# Patient Record
Sex: FEMALE | Race: WHITE | NOT HISPANIC OR LATINO | Employment: OTHER | URBAN - METROPOLITAN AREA
[De-identification: names, ages, dates, MRNs, and addresses within clinical notes are randomized per-mention and may not be internally consistent; named-entity substitution may affect disease eponyms.]

---

## 2018-11-28 ENCOUNTER — NEW REFERRAL (OUTPATIENT)
Dept: URBAN - METROPOLITAN AREA CLINIC 26 | Facility: CLINIC | Age: 83
End: 2018-11-28

## 2018-11-28 VITALS
WEIGHT: 145 LBS | SYSTOLIC BLOOD PRESSURE: 104 MMHG | DIASTOLIC BLOOD PRESSURE: 80 MMHG | HEART RATE: 60 BPM | BODY MASS INDEX: 20.76 KG/M2 | HEIGHT: 70 IN

## 2018-11-28 DIAGNOSIS — H43.813: ICD-10-CM

## 2018-11-28 DIAGNOSIS — H02.833: ICD-10-CM

## 2018-11-28 DIAGNOSIS — H35.373: ICD-10-CM

## 2018-11-28 DIAGNOSIS — H04.123: ICD-10-CM

## 2018-11-28 DIAGNOSIS — H53.8: ICD-10-CM

## 2018-11-28 DIAGNOSIS — H02.836: ICD-10-CM

## 2018-11-28 PROCEDURE — 92004 COMPRE OPH EXAM NEW PT 1/>: CPT

## 2018-11-28 PROCEDURE — 92250 FUNDUS PHOTOGRAPHY W/I&R: CPT

## 2018-11-28 PROCEDURE — 92134 CPTRZ OPH DX IMG PST SGM RTA: CPT

## 2018-11-28 PROCEDURE — 92235 FLUORESCEIN ANGRPH MLTIFRAME: CPT

## 2018-11-28 ASSESSMENT — TONOMETRY
OS_IOP_MMHG: 15
OD_IOP_MMHG: 13

## 2018-11-28 ASSESSMENT — VISUAL ACUITY
OD_CC: 20/200
OD_PH: 20/100
OS_CC: 20/100

## 2020-01-28 ENCOUNTER — UNSCHEDULED FOLLOW UP (OUTPATIENT)
Dept: URBAN - METROPOLITAN AREA CLINIC 26 | Facility: CLINIC | Age: 85
End: 2020-01-28

## 2020-01-28 VITALS — BODY MASS INDEX: 19.76 KG/M2 | HEIGHT: 70 IN | WEIGHT: 138 LBS

## 2020-01-28 DIAGNOSIS — H43.813: ICD-10-CM

## 2020-01-28 DIAGNOSIS — H53.8: ICD-10-CM

## 2020-01-28 DIAGNOSIS — S05.00XA: ICD-10-CM

## 2020-01-28 DIAGNOSIS — H35.373: ICD-10-CM

## 2020-01-28 PROCEDURE — 92014 COMPRE OPH EXAM EST PT 1/>: CPT

## 2020-01-28 PROCEDURE — 92134 CPTRZ OPH DX IMG PST SGM RTA: CPT

## 2020-01-28 PROCEDURE — 92250 FUNDUS PHOTOGRAPHY W/I&R: CPT

## 2020-01-28 RX ORDER — ERYTHROMYCIN 5 MG/G: OINTMENT OPHTHALMIC

## 2020-01-28 ASSESSMENT — VISUAL ACUITY
OD_SC: 20/30-1
OS_SC: CF 2FT

## 2020-01-28 ASSESSMENT — TONOMETRY
OD_IOP_MMHG: 16
OS_IOP_MMHG: 14

## 2022-07-09 ENCOUNTER — TELEPHONE ENCOUNTER (OUTPATIENT)
Dept: URBAN - METROPOLITAN AREA CLINIC 121 | Facility: CLINIC | Age: 87
End: 2022-07-09

## 2022-07-09 RX ORDER — LEVOTHYROXINE SODIUM ANHYDROUS 100 UG/5ML
INJECTION, POWDER, LYOPHILIZED, FOR SOLUTION INTRAVENOUS
Refills: 0 | OUTPATIENT
Start: 2013-03-04 | End: 2014-09-17

## 2022-07-09 RX ORDER — ASPIRIN 81 MG/1
TABLET, COATED ORAL
Refills: 0 | OUTPATIENT
Start: 2013-03-04 | End: 2014-09-17

## 2022-07-09 RX ORDER — SERTRALINE 100 MG/1
TABLET, FILM COATED ORAL ONCE A DAY
Refills: 0 | OUTPATIENT
Start: 2014-09-17 | End: 2014-11-24

## 2022-07-09 RX ORDER — PANTOPRAZOLE SODIUM 40 MG/1
TABLET, DELAYED RELEASE ORAL
Refills: 0 | OUTPATIENT
Start: 2013-03-04 | End: 2014-09-17

## 2022-07-09 RX ORDER — LISINOPRIL 2.5 MG/1
TABLET ORAL
Refills: 0 | OUTPATIENT
Start: 2013-03-04 | End: 2014-09-17

## 2022-07-09 RX ORDER — LEVOTHYROXINE SODIUM 125 UG/1
TABLET ORAL ONCE A DAY
Refills: 0 | OUTPATIENT
Start: 2014-09-17 | End: 2014-11-24

## 2022-07-09 RX ORDER — FLECAINIDE ACETATE 50 MG/1
TABLET ORAL
Refills: 0 | OUTPATIENT
Start: 2014-09-17 | End: 2014-11-24

## 2022-07-09 RX ORDER — FLECAINIDE ACETATE 50 MG/1
TABLET ORAL TWICE A DAY
Refills: 0 | OUTPATIENT
Start: 2014-11-24 | End: 2016-01-05

## 2022-07-09 RX ORDER — METOPROLOL SUCCINATE 25 MG/1
TABLET, EXTENDED RELEASE ORAL TAKE AS DIRECTED
Refills: 0 | OUTPATIENT
Start: 2014-11-24 | End: 2016-01-05

## 2022-07-09 RX ORDER — GLUCOSAMINE/MSM/CHONDROIT SULF 500-166.6
TABLET ORAL
Refills: 0 | OUTPATIENT
Start: 2014-09-17 | End: 2014-11-24

## 2022-07-09 RX ORDER — TEMAZEPAM 15 MG/1
CAPSULE ORAL TAKE AS DIRECTED
Refills: 0 | OUTPATIENT
Start: 2014-11-24 | End: 2016-01-05

## 2022-07-09 RX ORDER — METOPROLOL SUCCINATE 25 MG/1
TABLET, EXTENDED RELEASE ORAL
Refills: 0 | OUTPATIENT
Start: 2013-03-04 | End: 2014-09-17

## 2022-07-09 RX ORDER — VITAM B12 100 MCG
TAB ORAL ONCE A DAY
Refills: 0 | OUTPATIENT
Start: 2014-11-24 | End: 2016-01-05

## 2022-07-09 RX ORDER — CALCIUM POLYCARBOPHIL 500 MG
TABLET,CHEWABLE ORAL
Refills: 0 | OUTPATIENT
Start: 2014-09-17 | End: 2014-11-24

## 2022-07-09 RX ORDER — SIMVASTATIN 40 MG/1
TABLET, FILM COATED ORAL
Refills: 0 | OUTPATIENT
Start: 2013-03-04 | End: 2014-09-17

## 2022-07-09 RX ORDER — ASPIRIN 81 MG/1
TABLET, COATED ORAL ONCE A DAY
Refills: 0 | OUTPATIENT
Start: 2016-01-05 | End: 2016-01-05

## 2022-07-09 RX ORDER — SIMVASTATIN 40 MG/1
TABLET, FILM COATED ORAL ONCE A DAY
Refills: 0 | OUTPATIENT
Start: 2014-09-17 | End: 2014-11-24

## 2022-07-09 RX ORDER — SERTRALINE 100 MG/1
TABLET, FILM COATED ORAL
Refills: 0 | OUTPATIENT
Start: 2013-03-04 | End: 2014-09-17

## 2022-07-09 RX ORDER — SIMVASTATIN 40 MG/1
TABLET, FILM COATED ORAL ONCE A DAY
Refills: 0 | OUTPATIENT
Start: 2014-11-24 | End: 2016-01-05

## 2022-07-09 RX ORDER — RANOLAZINE 500 MG/1
TABLET, FILM COATED, EXTENDED RELEASE ORAL TWICE A DAY
Refills: 0 | OUTPATIENT
Start: 2014-09-17 | End: 2014-11-24

## 2022-07-09 RX ORDER — TRAZODONE HYDROCHLORIDE 50 MG/1
TABLET ORAL TAKE AS DIRECTED
Refills: 0 | OUTPATIENT
Start: 2014-09-17 | End: 2014-11-24

## 2022-07-09 RX ORDER — PANTOPRAZOLE SODIUM 40 MG/1
TABLET, DELAYED RELEASE ORAL ONCE A DAY
Refills: 0 | OUTPATIENT
Start: 2014-09-17 | End: 2014-11-24

## 2022-07-09 RX ORDER — CHOLECALCIFEROL (VITAMIN D3)
CRYSTALS MISCELLANEOUS ONCE A DAY
Refills: 0 | OUTPATIENT
Start: 2014-11-24 | End: 2016-01-05

## 2022-07-09 RX ORDER — SERTRALINE 100 MG/1
TABLET, FILM COATED ORAL ONCE A DAY
Refills: 0 | OUTPATIENT
Start: 2014-11-24 | End: 2016-01-05

## 2022-07-09 RX ORDER — MULTIVIT-MIN/FA/LYCOPEN/LUTEIN .4-300-25
TABLET ORAL
Refills: 0 | OUTPATIENT
Start: 2013-03-04 | End: 2014-09-17

## 2022-07-09 RX ORDER — NITROGLYCERIN 80 MG/1
PATCH TRANSDERMAL TAKE AS DIRECTED
Refills: 0 | OUTPATIENT
Start: 2014-11-24 | End: 2016-01-05

## 2022-07-09 RX ORDER — WHITE PETROLATUM 1.75 OZ
OINTMENT TOPICAL ONCE A DAY
Refills: 0 | OUTPATIENT
Start: 2014-11-24 | End: 2016-01-05

## 2022-07-09 RX ORDER — FUROSEMIDE 20 MG/1
TABLET ORAL TAKE AS DIRECTED
Refills: 0 | OUTPATIENT
Start: 2014-11-24 | End: 2016-01-05

## 2022-07-09 RX ORDER — UBIDECARENONE 100 MG
CAPSULE ORAL
Refills: 0 | OUTPATIENT
Start: 2014-09-17 | End: 2014-11-24

## 2022-07-09 RX ORDER — ASPIRIN 81 MG
TABLET,CHEWABLE ORAL TAKE AS DIRECTED
Refills: 0 | OUTPATIENT
Start: 2014-11-24 | End: 2016-01-05

## 2022-07-09 RX ORDER — ASPIRIN 81 MG/1
TABLET, COATED ORAL ONCE A DAY
Refills: 0 | OUTPATIENT
Start: 2014-09-17 | End: 2014-11-24

## 2022-07-09 RX ORDER — METOPROLOL SUCCINATE 25 MG/1
TABLET, EXTENDED RELEASE ORAL
Refills: 0 | OUTPATIENT
Start: 2014-09-17 | End: 2014-11-24

## 2022-07-09 RX ORDER — ASPIRIN 81 MG/1
TABLET, COATED ORAL ONCE A DAY
Refills: 0 | OUTPATIENT
Start: 2014-11-24 | End: 2016-01-05

## 2022-07-09 RX ORDER — LEVOTHYROXINE SODIUM 125 UG/1
TABLET ORAL ONCE A DAY
Refills: 0 | OUTPATIENT
Start: 2014-11-24 | End: 2016-01-05

## 2022-07-09 RX ORDER — PANTOPRAZOLE SODIUM 40 MG/1
TABLET, DELAYED RELEASE ORAL ONCE A DAY
Refills: 0 | OUTPATIENT
Start: 2014-11-24 | End: 2016-01-05

## 2022-07-09 RX ORDER — TEMAZEPAM 15 MG/1
CAPSULE ORAL
Refills: 0 | OUTPATIENT
Start: 2013-03-04 | End: 2014-09-17

## 2022-07-09 RX ORDER — RANOLAZINE 500 MG/1
TABLET, FILM COATED, EXTENDED RELEASE ORAL TWICE A DAY
Refills: 0 | OUTPATIENT
Start: 2014-11-24 | End: 2016-01-05

## 2022-07-09 RX ORDER — FUROSEMIDE 40 MG/1
TABLET ORAL ONCE A DAY
Refills: 0 | OUTPATIENT
Start: 2014-09-17 | End: 2014-11-24

## 2022-07-09 RX ORDER — FLECAINIDE ACETATE 100 MG/1
TABLET ORAL
Refills: 0 | OUTPATIENT
Start: 2013-03-04 | End: 2014-09-17

## 2022-07-10 ENCOUNTER — TELEPHONE ENCOUNTER (OUTPATIENT)
Dept: URBAN - METROPOLITAN AREA CLINIC 121 | Facility: CLINIC | Age: 87
End: 2022-07-10

## 2022-07-10 RX ORDER — FLECAINIDE ACETATE 50 MG/1
TABLET ORAL TWICE A DAY
Refills: 0 | Status: ACTIVE | COMMUNITY
Start: 2016-01-05

## 2022-07-10 RX ORDER — TEMAZEPAM 15 MG/1
CAPSULE ORAL ONCE A DAY
Refills: 0 | Status: ACTIVE | COMMUNITY
Start: 2016-01-05

## 2022-07-10 RX ORDER — ASPIRIN 81 MG/1
TABLET, COATED ORAL TWICE A DAY
Refills: 0 | Status: ACTIVE | COMMUNITY
Start: 2016-01-05

## 2022-07-10 RX ORDER — SIMVASTATIN 40 MG/1
TABLET, FILM COATED ORAL ONCE A DAY
Refills: 0 | Status: ACTIVE | COMMUNITY
Start: 2016-01-05

## 2022-07-10 RX ORDER — METOPROLOL SUCCINATE 25 MG/1
TABLET, EXTENDED RELEASE ORAL ONCE A DAY
Refills: 0 | Status: ACTIVE | COMMUNITY
Start: 2016-01-05

## 2022-07-10 RX ORDER — LORATADINE 10 MG
TABLET,DISINTEGRATING ORAL TWICE A DAY
Refills: 1 | Status: ACTIVE | COMMUNITY
Start: 2014-09-17

## 2022-07-10 RX ORDER — FUROSEMIDE 20 MG/1
TABLET ORAL ONCE A DAY
Refills: 0 | Status: ACTIVE | COMMUNITY
Start: 2016-01-05

## 2022-07-10 RX ORDER — PANTOPRAZOLE SODIUM 40 MG/1
TABLET, DELAYED RELEASE ORAL ONCE A DAY
Refills: 0 | Status: ACTIVE | COMMUNITY
Start: 2016-01-05

## 2022-07-10 RX ORDER — VITAM B12 100 MCG
TAB ORAL ONCE A DAY
Refills: 0 | Status: ACTIVE | COMMUNITY
Start: 2016-01-05

## 2022-07-10 RX ORDER — ASPIRIN 81 MG
TABLET,CHEWABLE ORAL
Refills: 0 | Status: ACTIVE | COMMUNITY
Start: 2016-01-05

## 2022-07-10 RX ORDER — LEVOTHYROXINE SODIUM 125 UG/1
TABLET ORAL ONCE A DAY
Refills: 0 | Status: ACTIVE | COMMUNITY
Start: 2016-01-05

## 2022-07-10 RX ORDER — RANOLAZINE 500 MG/1
TABLET, FILM COATED, EXTENDED RELEASE ORAL TWICE A DAY
Refills: 0 | Status: ACTIVE | COMMUNITY
Start: 2016-01-05

## 2022-07-10 RX ORDER — WHITE PETROLATUM 1.75 OZ
OINTMENT TOPICAL ONCE A DAY
Refills: 0 | Status: ACTIVE | COMMUNITY
Start: 2016-01-05

## 2022-07-10 RX ORDER — NITROGLYCERIN 80 MG/1
PATCH TRANSDERMAL AS NEEDED
Refills: 0 | Status: ACTIVE | COMMUNITY
Start: 2016-01-05

## 2022-07-10 RX ORDER — SERTRALINE 100 MG/1
TABLET, FILM COATED ORAL ONCE A DAY
Refills: 0 | Status: ACTIVE | COMMUNITY
Start: 2016-01-05